# Patient Record
Sex: MALE
[De-identification: names, ages, dates, MRNs, and addresses within clinical notes are randomized per-mention and may not be internally consistent; named-entity substitution may affect disease eponyms.]

---

## 2018-04-01 ENCOUNTER — TRANSCRIPTION ENCOUNTER (OUTPATIENT)
Age: 63
End: 2018-04-01

## 2018-04-15 ENCOUNTER — TRANSCRIPTION ENCOUNTER (OUTPATIENT)
Age: 63
End: 2018-04-15

## 2018-06-30 ENCOUNTER — TRANSCRIPTION ENCOUNTER (OUTPATIENT)
Age: 63
End: 2018-06-30

## 2020-05-15 ENCOUNTER — TRANSCRIPTION ENCOUNTER (OUTPATIENT)
Age: 65
End: 2020-05-15

## 2020-05-16 ENCOUNTER — TRANSCRIPTION ENCOUNTER (OUTPATIENT)
Age: 65
End: 2020-05-16

## 2020-10-19 ENCOUNTER — TRANSCRIPTION ENCOUNTER (OUTPATIENT)
Age: 65
End: 2020-10-19

## 2020-10-20 ENCOUNTER — APPOINTMENT (OUTPATIENT)
Dept: OTOLARYNGOLOGY | Facility: CLINIC | Age: 65
End: 2020-10-20
Payer: MEDICARE

## 2020-10-20 VITALS — TEMPERATURE: 97.8 F | BODY MASS INDEX: 26.51 KG/M2 | WEIGHT: 200 LBS | HEIGHT: 73 IN

## 2020-10-20 DIAGNOSIS — Z80.1 FAMILY HISTORY OF MALIGNANT NEOPLASM OF TRACHEA, BRONCHUS AND LUNG: ICD-10-CM

## 2020-10-20 PROBLEM — Z00.00 ENCOUNTER FOR PREVENTIVE HEALTH EXAMINATION: Status: ACTIVE | Noted: 2020-10-20

## 2020-10-20 PROCEDURE — 69210 REMOVE IMPACTED EAR WAX UNI: CPT

## 2020-10-20 PROCEDURE — 99212 OFFICE O/P EST SF 10 MIN: CPT | Mod: 25

## 2020-10-20 NOTE — HISTORY OF PRESENT ILLNESS
[de-identified] : This gentleman is known to me. He has  a history of significant bilateral cerumen impaction. Today presents complaining of bilateral congestion in his ears.

## 2023-01-16 ENCOUNTER — OFFICE (OUTPATIENT)
Dept: URBAN - METROPOLITAN AREA CLINIC 93 | Facility: CLINIC | Age: 68
Setting detail: OPHTHALMOLOGY
End: 2023-01-16
Payer: MEDICARE

## 2023-01-16 DIAGNOSIS — H43.812: ICD-10-CM

## 2023-01-16 DIAGNOSIS — H02.831: ICD-10-CM

## 2023-01-16 DIAGNOSIS — H02.834: ICD-10-CM

## 2023-01-16 DIAGNOSIS — H25.13: ICD-10-CM

## 2023-01-16 DIAGNOSIS — H25.12: ICD-10-CM

## 2023-01-16 DIAGNOSIS — H35.372: ICD-10-CM

## 2023-01-16 DIAGNOSIS — H25.11: ICD-10-CM

## 2023-01-16 DIAGNOSIS — H16.223: ICD-10-CM

## 2023-01-16 PROCEDURE — 92134 CPTRZ OPH DX IMG PST SGM RTA: CPT | Performed by: OPHTHALMOLOGY

## 2023-01-16 PROCEDURE — 92014 COMPRE OPH EXAM EST PT 1/>: CPT | Performed by: OPHTHALMOLOGY

## 2023-01-16 PROCEDURE — 92202 OPSCPY EXTND ON/MAC DRAW: CPT | Performed by: OPHTHALMOLOGY

## 2023-01-16 ASSESSMENT — REFRACTION_AUTOREFRACTION
OS_AXIS: 059
OS_CYLINDER: -1.50
OD_SPHERE: +2.00
OD_AXIS: 131
OD_CYLINDER: -0.75
OS_SPHERE: +2.50

## 2023-01-16 ASSESSMENT — SPHEQUIV_DERIVED
OS_SPHEQUIV: 1.75
OD_SPHEQUIV: 1.625

## 2023-01-16 ASSESSMENT — SUPERFICIAL PUNCTATE KERATITIS (SPK)
OD_SPK: 2+
OS_SPK: 2+

## 2023-01-16 ASSESSMENT — VISUAL ACUITY
OD_BCVA: 20/30-2
OS_BCVA: 20/30-1

## 2023-01-16 ASSESSMENT — TONOMETRY
OD_IOP_MMHG: 14
OS_IOP_MMHG: 15

## 2023-01-16 ASSESSMENT — KERATOMETRY
OD_K2POWER_DIOPTERS: 45.25
OS_K2POWER_DIOPTERS: 45.00
OD_AXISANGLE_DEGREES: 090
OS_AXISANGLE_DEGREES: 077
OD_K1POWER_DIOPTERS: 45.75
OS_K1POWER_DIOPTERS: 45.75

## 2023-01-16 ASSESSMENT — CONFRONTATIONAL VISUAL FIELD TEST (CVF)
OS_FINDINGS: FULL
OD_FINDINGS: FULL

## 2023-01-16 ASSESSMENT — AXIALLENGTH_DERIVED
OD_AL: 22.2991
OS_AL: 22.2966

## 2023-01-16 ASSESSMENT — LID POSITION - DERMATOCHALASIS
OS_DERMATOCHALASIS: LUL 2+
OD_DERMATOCHALASIS: RUL 2+

## 2023-03-18 ENCOUNTER — NON-APPOINTMENT (OUTPATIENT)
Age: 68
End: 2023-03-18

## 2023-05-12 ENCOUNTER — OFFICE (OUTPATIENT)
Facility: LOCATION | Age: 68
Setting detail: OPHTHALMOLOGY
End: 2023-05-12
Payer: MEDICARE

## 2023-05-12 DIAGNOSIS — H25.13: ICD-10-CM

## 2023-05-12 DIAGNOSIS — H35.372: ICD-10-CM

## 2023-05-12 DIAGNOSIS — H02.834: ICD-10-CM

## 2023-05-12 DIAGNOSIS — H16.223: ICD-10-CM

## 2023-05-12 DIAGNOSIS — H02.831: ICD-10-CM

## 2023-05-12 DIAGNOSIS — H43.812: ICD-10-CM

## 2023-05-12 DIAGNOSIS — H25.12: ICD-10-CM

## 2023-05-12 PROCEDURE — 92136 OPHTHALMIC BIOMETRY: CPT | Performed by: OPHTHALMOLOGY

## 2023-05-12 PROCEDURE — 99214 OFFICE O/P EST MOD 30 MIN: CPT | Performed by: OPHTHALMOLOGY

## 2023-05-12 PROCEDURE — 92134 CPTRZ OPH DX IMG PST SGM RTA: CPT | Performed by: OPHTHALMOLOGY

## 2023-05-12 ASSESSMENT — AXIALLENGTH_DERIVED
OS_AL: 22.1264
OD_AL: 22.2149

## 2023-05-12 ASSESSMENT — VISUAL ACUITY
OS_BCVA: 20/25-
OD_BCVA: 20/40+2

## 2023-05-12 ASSESSMENT — KERATOMETRY
OS_CYLPOWER_DEGREES: 1
OS_K1POWER_DIOPTERS: 46.00
OS_K2POWER_DIOPTERS: 45.00
OD_AXISANGLE_DEGREES: 082
OD_AXISANGLE2_DEGREES: 172
OS_K2POWER_DIOPTERS: 45.00
OD_CYLAXISANGLE_DEGREES: 082
OD_K1K2_AVERAGE: 45.625
OD_K1POWER_DIOPTERS: 46.00
OS_CYLAXISANGLE_DEGREES: 082
OS_K1POWER_DIOPTERS: 46.00
OS_AXISANGLE_DEGREES: 082
OS_AXISANGLE_DEGREES: 082
OD_K2POWER_DIOPTERS: 45.25
OD_K1POWER_DIOPTERS: 46.00
OS_AXISANGLE2_DEGREES: 172
OS_K1K2_AVERAGE: 45.5
OD_K2POWER_DIOPTERS: 45.25
OD_CYLPOWER_DEGREES: 0.75
OD_AXISANGLE_DEGREES: 082

## 2023-05-12 ASSESSMENT — REFRACTION_CURRENTRX
OD_OVR_VA: 20/
OS_AXIS: 079
OS_SPHERE: +1.25
OD_SPHERE: +1.75
OS_OVR_VA: 20/
OS_ADD: +2.75
OD_AXIS: 124
OD_CYLINDER: -1.00
OS_CYLINDER: -1.00
OD_ADD: +2.75

## 2023-05-12 ASSESSMENT — CONFRONTATIONAL VISUAL FIELD TEST (CVF)
OS_FINDINGS: FULL
OD_FINDINGS: FULL

## 2023-05-12 ASSESSMENT — SPHEQUIV_DERIVED
OD_SPHEQUIV: 1.75
OS_SPHEQUIV: 2.125

## 2023-05-12 ASSESSMENT — REFRACTION_AUTOREFRACTION
OD_CYLINDER: -0.50
OD_AXIS: 117
OS_SPHERE: +3.00
OS_CYLINDER: -1.75
OS_AXIS: 070
OD_SPHERE: +2.00

## 2023-05-12 ASSESSMENT — LID POSITION - DERMATOCHALASIS
OD_DERMATOCHALASIS: RUL 2+
OS_DERMATOCHALASIS: LUL 2+

## 2023-05-12 ASSESSMENT — SUPERFICIAL PUNCTATE KERATITIS (SPK)
OS_SPK: 2+
OD_SPK: 2+

## 2023-05-12 ASSESSMENT — TONOMETRY
OS_IOP_MMHG: 14
OD_IOP_MMHG: 13

## 2023-05-16 ENCOUNTER — OFFICE (OUTPATIENT)
Facility: LOCATION | Age: 68
Setting detail: OPHTHALMOLOGY
End: 2023-05-16
Payer: MEDICARE

## 2023-05-16 DIAGNOSIS — H35.3122: ICD-10-CM

## 2023-05-16 DIAGNOSIS — H43.812: ICD-10-CM

## 2023-05-16 DIAGNOSIS — H43.821: ICD-10-CM

## 2023-05-16 DIAGNOSIS — H35.3111: ICD-10-CM

## 2023-05-16 DIAGNOSIS — H35.372: ICD-10-CM

## 2023-05-16 PROCEDURE — 92134 CPTRZ OPH DX IMG PST SGM RTA: CPT | Performed by: OPHTHALMOLOGY

## 2023-05-16 PROCEDURE — 92202 OPSCPY EXTND ON/MAC DRAW: CPT | Performed by: OPHTHALMOLOGY

## 2023-05-16 PROCEDURE — 92012 INTRM OPH EXAM EST PATIENT: CPT | Performed by: OPHTHALMOLOGY

## 2023-05-16 ASSESSMENT — AXIALLENGTH_DERIVED
OD_AL: 22.2149
OS_AL: 22.1264

## 2023-05-16 ASSESSMENT — SUPERFICIAL PUNCTATE KERATITIS (SPK)
OD_SPK: 2+
OS_SPK: 2+

## 2023-05-16 ASSESSMENT — KERATOMETRY
OS_AXISANGLE_DEGREES: 082
OD_K2POWER_DIOPTERS: 45.25
OD_AXISANGLE_DEGREES: 082
OS_K2POWER_DIOPTERS: 45.00
OS_K1POWER_DIOPTERS: 46.00
OD_K1POWER_DIOPTERS: 46.00

## 2023-05-16 ASSESSMENT — REFRACTION_AUTOREFRACTION
OS_AXIS: 070
OS_CYLINDER: -1.75
OS_SPHERE: +3.00
OD_CYLINDER: -0.50
OD_SPHERE: +2.00
OD_AXIS: 117

## 2023-05-16 ASSESSMENT — VISUAL ACUITY
OS_BCVA: 20/40-
OD_BCVA: 20/40-

## 2023-05-16 ASSESSMENT — SPHEQUIV_DERIVED
OS_SPHEQUIV: 2.125
OD_SPHEQUIV: 1.75

## 2023-05-16 ASSESSMENT — LID POSITION - DERMATOCHALASIS
OD_DERMATOCHALASIS: RUL 2+
OS_DERMATOCHALASIS: LUL 2+

## 2023-05-16 ASSESSMENT — CONFRONTATIONAL VISUAL FIELD TEST (CVF)
OS_FINDINGS: FULL
OD_FINDINGS: FULL

## 2023-07-10 ENCOUNTER — ASC (OUTPATIENT)
Dept: URBAN - METROPOLITAN AREA SURGERY 8 | Facility: SURGERY | Age: 68
Setting detail: OPHTHALMOLOGY
End: 2023-07-10
Payer: MEDICARE

## 2023-07-10 DIAGNOSIS — H25.12: ICD-10-CM

## 2023-07-10 DIAGNOSIS — H52.212: ICD-10-CM

## 2023-07-10 PROCEDURE — FEMTO FEMTOSECOND LASER: Performed by: OPHTHALMOLOGY

## 2023-07-10 PROCEDURE — A9270 NON-COVERED ITEM OR SERVICE: HCPCS | Performed by: OPHTHALMOLOGY

## 2023-07-10 PROCEDURE — 66984 XCAPSL CTRC RMVL W/O ECP: CPT | Performed by: OPHTHALMOLOGY

## 2023-07-11 ENCOUNTER — RX ONLY (RX ONLY)
Age: 68
End: 2023-07-11

## 2023-07-11 ENCOUNTER — OFFICE (OUTPATIENT)
Facility: LOCATION | Age: 68
Setting detail: OPHTHALMOLOGY
End: 2023-07-11
Payer: MEDICARE

## 2023-07-11 DIAGNOSIS — Z96.1: ICD-10-CM

## 2023-07-11 DIAGNOSIS — H26.492: ICD-10-CM

## 2023-07-11 PROCEDURE — 99024 POSTOP FOLLOW-UP VISIT: CPT | Performed by: OPHTHALMOLOGY

## 2023-07-11 ASSESSMENT — AXIALLENGTH_DERIVED
OS_AL: 22.7908
OD_AL: 22.3016

## 2023-07-11 ASSESSMENT — SPHEQUIV_DERIVED
OS_SPHEQUIV: 0.125
OD_SPHEQUIV: 1.5

## 2023-07-11 ASSESSMENT — CONFRONTATIONAL VISUAL FIELD TEST (CVF)
OD_FINDINGS: FULL
OS_FINDINGS: FULL

## 2023-07-11 ASSESSMENT — TONOMETRY: OS_IOP_MMHG: 17

## 2023-07-11 ASSESSMENT — KERATOMETRY
OS_AXISANGLE_DEGREES: 107
OD_K2POWER_DIOPTERS: 45.25
OS_K1POWER_DIOPTERS: 45.25
OS_K2POWER_DIOPTERS: 46.00
OD_AXISANGLE_DEGREES: 079
OD_K1POWER_DIOPTERS: 46.00

## 2023-07-11 ASSESSMENT — REFRACTION_AUTOREFRACTION
OD_AXIS: 118
OS_AXIS: 047
OD_CYLINDER: -0.50
OD_SPHERE: +1.75
OS_SPHERE: +0.50
OS_CYLINDER: -0.75

## 2023-07-11 ASSESSMENT — VISUAL ACUITY
OS_BCVA: 20/40-
OD_BCVA: 20/25-2

## 2023-07-11 ASSESSMENT — SUPERFICIAL PUNCTATE KERATITIS (SPK)
OS_SPK: 2+
OD_SPK: 2+

## 2023-07-11 ASSESSMENT — LID POSITION - DERMATOCHALASIS
OS_DERMATOCHALASIS: LUL 2+
OD_DERMATOCHALASIS: RUL 2+

## 2023-07-20 ENCOUNTER — OFFICE (OUTPATIENT)
Facility: LOCATION | Age: 68
Setting detail: OPHTHALMOLOGY
End: 2023-07-20
Payer: MEDICARE

## 2023-07-20 DIAGNOSIS — Z96.1: ICD-10-CM

## 2023-07-20 DIAGNOSIS — H26.492: ICD-10-CM

## 2023-07-20 DIAGNOSIS — H25.11: ICD-10-CM

## 2023-07-20 PROCEDURE — 92136 OPHTHALMIC BIOMETRY: CPT | Performed by: OPHTHALMOLOGY

## 2023-07-20 PROCEDURE — 99024 POSTOP FOLLOW-UP VISIT: CPT | Performed by: OPHTHALMOLOGY

## 2023-07-20 ASSESSMENT — SUPERFICIAL PUNCTATE KERATITIS (SPK)
OD_SPK: 2+
OS_SPK: 2+

## 2023-07-20 ASSESSMENT — LID POSITION - DERMATOCHALASIS
OD_DERMATOCHALASIS: RUL 2+
OS_DERMATOCHALASIS: LUL 2+

## 2023-07-20 ASSESSMENT — CONFRONTATIONAL VISUAL FIELD TEST (CVF)
OD_FINDINGS: FULL
OS_FINDINGS: FULL

## 2023-07-21 ASSESSMENT — AXIALLENGTH_DERIVED
OS_AL: 22.7908
OD_AL: 22.3016

## 2023-07-21 ASSESSMENT — VISUAL ACUITY
OS_BCVA: 20/30
OD_BCVA: 20/20

## 2023-07-21 ASSESSMENT — KERATOMETRY
OD_AXISANGLE_DEGREES: 079
OS_K2POWER_DIOPTERS: 46.00
OD_K1POWER_DIOPTERS: 46.00
OS_K1POWER_DIOPTERS: 45.25
OD_K2POWER_DIOPTERS: 45.25
OS_AXISANGLE_DEGREES: 107

## 2023-07-21 ASSESSMENT — REFRACTION_AUTOREFRACTION
OS_CYLINDER: -0.75
OD_CYLINDER: -0.50
OS_SPHERE: +0.50
OS_AXIS: 047
OD_SPHERE: +1.75
OD_AXIS: 118

## 2023-07-21 ASSESSMENT — SPHEQUIV_DERIVED
OS_SPHEQUIV: 0.125
OD_SPHEQUIV: 1.5

## 2023-07-24 ENCOUNTER — ASC (OUTPATIENT)
Dept: URBAN - METROPOLITAN AREA SURGERY 8 | Facility: SURGERY | Age: 68
Setting detail: OPHTHALMOLOGY
End: 2023-07-24
Payer: MEDICARE

## 2023-07-24 DIAGNOSIS — H25.11: ICD-10-CM

## 2023-07-24 DIAGNOSIS — H52.211: ICD-10-CM

## 2023-07-24 PROCEDURE — 66984 XCAPSL CTRC RMVL W/O ECP: CPT | Performed by: OPHTHALMOLOGY

## 2023-07-24 PROCEDURE — FEMTO FEMTOSECOND LASER: Performed by: OPHTHALMOLOGY

## 2023-07-24 PROCEDURE — A9270 NON-COVERED ITEM OR SERVICE: HCPCS | Performed by: OPHTHALMOLOGY

## 2023-07-25 ENCOUNTER — OFFICE (OUTPATIENT)
Facility: LOCATION | Age: 68
Setting detail: OPHTHALMOLOGY
End: 2023-07-25
Payer: MEDICARE

## 2023-07-25 ENCOUNTER — RX ONLY (RX ONLY)
Age: 68
End: 2023-07-25

## 2023-07-25 DIAGNOSIS — Z96.1: ICD-10-CM

## 2023-07-25 PROCEDURE — 99024 POSTOP FOLLOW-UP VISIT: CPT | Performed by: OPHTHALMOLOGY

## 2023-07-25 ASSESSMENT — AXIALLENGTH_DERIVED
OS_AL: 22.6604
OD_AL: 22.8585

## 2023-07-25 ASSESSMENT — KERATOMETRY
OS_AXISANGLE_DEGREES: 107
OD_K2POWER_DIOPTERS: 43.25
OS_K1POWER_DIOPTERS: 45.75
OS_K2POWER_DIOPTERS: 46.00
OD_K1POWER_DIOPTERS: 45.75
OD_AXISANGLE_DEGREES: 178

## 2023-07-25 ASSESSMENT — SUPERFICIAL PUNCTATE KERATITIS (SPK)
OD_SPK: 2+
OS_SPK: 2+

## 2023-07-25 ASSESSMENT — LID POSITION - DERMATOCHALASIS
OS_DERMATOCHALASIS: LUL 2+
OD_DERMATOCHALASIS: RUL 2+

## 2023-07-25 ASSESSMENT — TONOMETRY
OS_IOP_MMHG: 15
OD_IOP_MMHG: 14

## 2023-07-25 ASSESSMENT — VISUAL ACUITY
OD_BCVA: 20/20-1
OS_BCVA: 20/20-1

## 2023-07-25 ASSESSMENT — SPHEQUIV_DERIVED
OS_SPHEQUIV: 0.25
OD_SPHEQUIV: 1

## 2023-07-25 ASSESSMENT — REFRACTION_AUTOREFRACTION
OS_CYLINDER: -1.00
OS_SPHERE: +0.75
OS_AXIS: 071
OD_AXIS: 094
OD_CYLINDER: -2.50
OD_SPHERE: +2.25

## 2023-07-25 ASSESSMENT — CORNEAL EDEMA CLINICAL DESCRIPTION: OD_CORNEALEDEMA: 2+

## 2023-08-01 ENCOUNTER — OFFICE (OUTPATIENT)
Facility: LOCATION | Age: 68
Setting detail: OPHTHALMOLOGY
End: 2023-08-01
Payer: MEDICARE

## 2023-08-01 DIAGNOSIS — Z96.1: ICD-10-CM

## 2023-08-01 DIAGNOSIS — H26.493: ICD-10-CM

## 2023-08-01 PROCEDURE — 99024 POSTOP FOLLOW-UP VISIT: CPT | Performed by: OPHTHALMOLOGY

## 2023-08-01 ASSESSMENT — CORNEAL EDEMA CLINICAL DESCRIPTION: OD_CORNEALEDEMA: ABSENT

## 2023-08-01 ASSESSMENT — KERATOMETRY
OD_K1POWER_DIOPTERS: 45.75
OD_K2POWER_DIOPTERS: 44.75
OS_AXISANGLE_DEGREES: 108
OS_K1POWER_DIOPTERS: 45.50
OS_K2POWER_DIOPTERS: 45.75
OD_AXISANGLE_DEGREES: 077

## 2023-08-01 ASSESSMENT — REFRACTION_AUTOREFRACTION
OD_SPHERE: +1.25
OS_SPHERE: +1.00
OS_AXIS: 074
OD_AXIS: 082
OS_CYLINDER: -1.25
OD_CYLINDER: -1.75

## 2023-08-01 ASSESSMENT — SPHEQUIV_DERIVED
OS_SPHEQUIV: 0.375
OD_SPHEQUIV: 0.375

## 2023-08-01 ASSESSMENT — AXIALLENGTH_DERIVED
OS_AL: 22.7003
OD_AL: 22.8285

## 2023-08-01 ASSESSMENT — SUPERFICIAL PUNCTATE KERATITIS (SPK)
OD_SPK: 2+
OS_SPK: 2+

## 2023-08-01 ASSESSMENT — TONOMETRY
OS_IOP_MMHG: 14
OD_IOP_MMHG: 14

## 2023-08-01 ASSESSMENT — CONFRONTATIONAL VISUAL FIELD TEST (CVF)
OD_FINDINGS: FULL
OS_FINDINGS: FULL

## 2023-08-01 ASSESSMENT — LID POSITION - DERMATOCHALASIS
OD_DERMATOCHALASIS: RUL 2+
OS_DERMATOCHALASIS: LUL 2+

## 2023-08-01 ASSESSMENT — VISUAL ACUITY
OD_BCVA: 20/40
OS_BCVA: 20/30

## 2023-08-23 ENCOUNTER — OFFICE (OUTPATIENT)
Facility: LOCATION | Age: 68
Setting detail: OPHTHALMOLOGY
End: 2023-08-23
Payer: MEDICARE

## 2023-08-23 DIAGNOSIS — Z96.1: ICD-10-CM

## 2023-08-23 DIAGNOSIS — H26.493: ICD-10-CM

## 2023-08-23 PROCEDURE — 99024 POSTOP FOLLOW-UP VISIT: CPT | Performed by: OPHTHALMOLOGY

## 2023-08-23 ASSESSMENT — LID POSITION - DERMATOCHALASIS
OD_DERMATOCHALASIS: RUL 2+
OS_DERMATOCHALASIS: LUL 2+

## 2023-08-23 ASSESSMENT — KERATOMETRY
OD_AXISANGLE_DEGREES: 077
OD_K2POWER_DIOPTERS: 44.75
OS_AXISANGLE_DEGREES: 108
OS_K2POWER_DIOPTERS: 45.75
OD_K1POWER_DIOPTERS: 45.75
OS_K1POWER_DIOPTERS: 45.50

## 2023-08-23 ASSESSMENT — REFRACTION_AUTOREFRACTION
OS_AXIS: 071
OD_CYLINDER: -1.00
OD_AXIS: 083
OS_CYLINDER: -1.75
OS_SPHERE: +1.50
OD_SPHERE: +1.00

## 2023-08-23 ASSESSMENT — CONFRONTATIONAL VISUAL FIELD TEST (CVF)
OS_FINDINGS: FULL
OD_FINDINGS: FULL

## 2023-08-23 ASSESSMENT — AXIALLENGTH_DERIVED
OS_AL: 22.6105
OD_AL: 22.783

## 2023-08-23 ASSESSMENT — VISUAL ACUITY
OD_BCVA: 20/20-2
OS_BCVA: 20/20-2

## 2023-08-23 ASSESSMENT — SUPERFICIAL PUNCTATE KERATITIS (SPK)
OD_SPK: 2+
OS_SPK: 2+

## 2023-08-23 ASSESSMENT — SPHEQUIV_DERIVED
OS_SPHEQUIV: 0.625
OD_SPHEQUIV: 0.5

## 2023-08-23 ASSESSMENT — CORNEAL EDEMA CLINICAL DESCRIPTION: OD_CORNEALEDEMA: ABSENT

## 2023-08-23 ASSESSMENT — TONOMETRY
OD_IOP_MMHG: 14
OS_IOP_MMHG: 14

## 2023-12-19 ENCOUNTER — OFFICE (OUTPATIENT)
Facility: LOCATION | Age: 68
Setting detail: OPHTHALMOLOGY
End: 2023-12-19
Payer: MEDICARE

## 2023-12-19 DIAGNOSIS — H35.3122: ICD-10-CM

## 2023-12-19 DIAGNOSIS — H35.3111: ICD-10-CM

## 2023-12-19 DIAGNOSIS — H43.821: ICD-10-CM

## 2023-12-19 DIAGNOSIS — H35.372: ICD-10-CM

## 2023-12-19 DIAGNOSIS — H43.812: ICD-10-CM

## 2023-12-19 PROCEDURE — 92134 CPTRZ OPH DX IMG PST SGM RTA: CPT | Performed by: OPHTHALMOLOGY

## 2023-12-19 PROCEDURE — 92014 COMPRE OPH EXAM EST PT 1/>: CPT | Performed by: OPHTHALMOLOGY

## 2023-12-19 ASSESSMENT — SPHEQUIV_DERIVED
OS_SPHEQUIV: 0.625
OD_SPHEQUIV: 0.5

## 2023-12-19 ASSESSMENT — REFRACTION_AUTOREFRACTION
OD_SPHERE: +1.00
OS_AXIS: 071
OD_AXIS: 083
OS_SPHERE: +1.50
OS_CYLINDER: -1.75
OD_CYLINDER: -1.00

## 2023-12-19 ASSESSMENT — LID POSITION - DERMATOCHALASIS
OS_DERMATOCHALASIS: LUL 2+
OD_DERMATOCHALASIS: RUL 2+

## 2023-12-19 ASSESSMENT — SUPERFICIAL PUNCTATE KERATITIS (SPK)
OS_SPK: 2+
OD_SPK: 2+

## 2023-12-19 ASSESSMENT — CORNEAL EDEMA CLINICAL DESCRIPTION: OD_CORNEALEDEMA: ABSENT

## 2024-08-16 ENCOUNTER — OFFICE (OUTPATIENT)
Facility: LOCATION | Age: 69
Setting detail: OPHTHALMOLOGY
End: 2024-08-16
Payer: MEDICARE

## 2024-08-16 DIAGNOSIS — H35.372: ICD-10-CM

## 2024-08-16 DIAGNOSIS — H16.223: ICD-10-CM

## 2024-08-16 DIAGNOSIS — H43.821: ICD-10-CM

## 2024-08-16 DIAGNOSIS — H35.3111: ICD-10-CM

## 2024-08-16 DIAGNOSIS — H43.812: ICD-10-CM

## 2024-08-16 DIAGNOSIS — H02.834: ICD-10-CM

## 2024-08-16 DIAGNOSIS — H02.831: ICD-10-CM

## 2024-08-16 DIAGNOSIS — H35.3122: ICD-10-CM

## 2024-08-16 DIAGNOSIS — H26.493: ICD-10-CM

## 2024-08-16 PROCEDURE — 92134 CPTRZ OPH DX IMG PST SGM RTA: CPT | Performed by: OPHTHALMOLOGY

## 2024-08-16 PROCEDURE — 99214 OFFICE O/P EST MOD 30 MIN: CPT | Performed by: OPHTHALMOLOGY

## 2024-08-16 ASSESSMENT — LID POSITION - DERMATOCHALASIS
OS_DERMATOCHALASIS: LUL 2+
OD_DERMATOCHALASIS: RUL 2+

## 2024-08-16 ASSESSMENT — CONFRONTATIONAL VISUAL FIELD TEST (CVF)
OS_FINDINGS: FULL
OD_FINDINGS: FULL

## 2025-04-19 ENCOUNTER — NON-APPOINTMENT (OUTPATIENT)
Age: 70
End: 2025-04-19

## 2025-09-21 ENCOUNTER — NON-APPOINTMENT (OUTPATIENT)
Age: 70
End: 2025-09-21